# Patient Record
Sex: FEMALE | Race: WHITE | NOT HISPANIC OR LATINO | Employment: OTHER | URBAN - METROPOLITAN AREA
[De-identification: names, ages, dates, MRNs, and addresses within clinical notes are randomized per-mention and may not be internally consistent; named-entity substitution may affect disease eponyms.]

---

## 2019-04-09 ENCOUNTER — TELEPHONE (OUTPATIENT)
Dept: ORTHOPEDICS | Facility: CLINIC | Age: 69
End: 2019-04-09

## 2019-04-09 NOTE — TELEPHONE ENCOUNTER
----- Message from Monika Mcdonald sent at 4/9/2019 12:53 PM CDT -----  Type:  RX Refill Request    Who Called:  Self   Refill or New Rx:  New rx   RX Name and Strength: generic rx volterlin   tropical gel 1 %   How is the patient currently taking it? (ex. 1XDay):  Is this a 30 day or 90 day RX:  30 day supply   Preferred Pharmacy with phone number:  [facility] in [place]  Local or Mail Order:  Local   Ordering Provider:  Dr Fontaine   [facility] Call Back Number:  [phone]  Additional Information:

## 2019-11-06 ENCOUNTER — OFFICE VISIT (OUTPATIENT)
Dept: OBSTETRICS AND GYNECOLOGY | Facility: CLINIC | Age: 69
End: 2019-11-06
Payer: MEDICARE

## 2019-11-06 ENCOUNTER — LAB VISIT (OUTPATIENT)
Dept: LAB | Facility: HOSPITAL | Age: 69
End: 2019-11-06
Attending: NURSE PRACTITIONER
Payer: MEDICARE

## 2019-11-06 ENCOUNTER — TELEPHONE (OUTPATIENT)
Dept: OBSTETRICS AND GYNECOLOGY | Facility: CLINIC | Age: 69
End: 2019-11-06

## 2019-11-06 VITALS
SYSTOLIC BLOOD PRESSURE: 132 MMHG | DIASTOLIC BLOOD PRESSURE: 68 MMHG | BODY MASS INDEX: 33.79 KG/M2 | HEIGHT: 61 IN | WEIGHT: 179 LBS

## 2019-11-06 DIAGNOSIS — C53.9 MALIGNANT NEOPLASM OF CERVIX, UNSPECIFIED SITE: ICD-10-CM

## 2019-11-06 DIAGNOSIS — C53.9 MALIGNANT NEOPLASM OF CERVIX, UNSPECIFIED SITE: Primary | ICD-10-CM

## 2019-11-06 DIAGNOSIS — R10.84 GENERALIZED ABDOMINAL PAIN: ICD-10-CM

## 2019-11-06 DIAGNOSIS — R10.2 PELVIC PAIN IN FEMALE: ICD-10-CM

## 2019-11-06 LAB
ALBUMIN SERPL BCP-MCNC: 3.8 G/DL (ref 3.5–5.2)
ALP SERPL-CCNC: 70 U/L (ref 55–135)
ALT SERPL W/O P-5'-P-CCNC: 10 U/L (ref 10–44)
ANION GAP SERPL CALC-SCNC: 12 MMOL/L (ref 8–16)
AST SERPL-CCNC: 14 U/L (ref 10–40)
BASOPHILS # BLD AUTO: 0.05 K/UL (ref 0–0.2)
BASOPHILS NFR BLD: 0.8 % (ref 0–1.9)
BILIRUB SERPL-MCNC: 0.7 MG/DL (ref 0.1–1)
BUN SERPL-MCNC: 12 MG/DL (ref 8–23)
CALCIUM SERPL-MCNC: 9.7 MG/DL (ref 8.7–10.5)
CHLORIDE SERPL-SCNC: 102 MMOL/L (ref 95–110)
CO2 SERPL-SCNC: 26 MMOL/L (ref 23–29)
CREAT SERPL-MCNC: 0.8 MG/DL (ref 0.5–1.4)
DIFFERENTIAL METHOD: ABNORMAL
EOSINOPHIL # BLD AUTO: 0.2 K/UL (ref 0–0.5)
EOSINOPHIL NFR BLD: 3.2 % (ref 0–8)
ERYTHROCYTE [DISTWIDTH] IN BLOOD BY AUTOMATED COUNT: 13.1 % (ref 11.5–14.5)
EST. GFR  (AFRICAN AMERICAN): >60 ML/MIN/1.73 M^2
EST. GFR  (NON AFRICAN AMERICAN): >60 ML/MIN/1.73 M^2
GLUCOSE SERPL-MCNC: 89 MG/DL (ref 70–110)
HCT VFR BLD AUTO: 39.9 % (ref 37–48.5)
HGB BLD-MCNC: 12.5 G/DL (ref 12–16)
IMM GRANULOCYTES # BLD AUTO: 0.02 K/UL (ref 0–0.04)
IMM GRANULOCYTES NFR BLD AUTO: 0.3 % (ref 0–0.5)
LYMPHOCYTES # BLD AUTO: 1.5 K/UL (ref 1–4.8)
LYMPHOCYTES NFR BLD: 26 % (ref 18–48)
MCH RBC QN AUTO: 28.3 PG (ref 27–31)
MCHC RBC AUTO-ENTMCNC: 31.3 G/DL (ref 32–36)
MCV RBC AUTO: 91 FL (ref 82–98)
MONOCYTES # BLD AUTO: 0.4 K/UL (ref 0.3–1)
MONOCYTES NFR BLD: 7.4 % (ref 4–15)
NEUTROPHILS # BLD AUTO: 3.7 K/UL (ref 1.8–7.7)
NEUTROPHILS NFR BLD: 62.3 % (ref 38–73)
NRBC BLD-RTO: 0 /100 WBC
PLATELET # BLD AUTO: 297 K/UL (ref 150–350)
PMV BLD AUTO: 9.4 FL (ref 9.2–12.9)
POTASSIUM SERPL-SCNC: 4 MMOL/L (ref 3.5–5.1)
PROT SERPL-MCNC: 7.7 G/DL (ref 6–8.4)
RBC # BLD AUTO: 4.41 M/UL (ref 4–5.4)
SODIUM SERPL-SCNC: 140 MMOL/L (ref 136–145)
WBC # BLD AUTO: 5.93 K/UL (ref 3.9–12.7)

## 2019-11-06 PROCEDURE — 87624 HPV HI-RISK TYP POOLED RSLT: CPT

## 2019-11-06 PROCEDURE — 3078F PR MOST RECENT DIASTOLIC BLOOD PRESSURE < 80 MM HG: ICD-10-PCS | Mod: CPTII,S$GLB,, | Performed by: NURSE PRACTITIONER

## 2019-11-06 PROCEDURE — G0101 PR CA SCREEN;PELVIC/BREAST EXAM: ICD-10-PCS | Mod: S$GLB,,, | Performed by: NURSE PRACTITIONER

## 2019-11-06 PROCEDURE — 3075F SYST BP GE 130 - 139MM HG: CPT | Mod: CPTII,S$GLB,, | Performed by: NURSE PRACTITIONER

## 2019-11-06 PROCEDURE — 87801 DETECT AGNT MULT DNA AMPLI: CPT

## 2019-11-06 PROCEDURE — 3078F DIAST BP <80 MM HG: CPT | Mod: CPTII,S$GLB,, | Performed by: NURSE PRACTITIONER

## 2019-11-06 PROCEDURE — 80053 COMPREHEN METABOLIC PANEL: CPT

## 2019-11-06 PROCEDURE — 3075F PR MOST RECENT SYSTOLIC BLOOD PRESS GE 130-139MM HG: ICD-10-PCS | Mod: CPTII,S$GLB,, | Performed by: NURSE PRACTITIONER

## 2019-11-06 PROCEDURE — 36415 COLL VENOUS BLD VENIPUNCTURE: CPT

## 2019-11-06 PROCEDURE — 99999 PR PBB SHADOW E&M-EST. PATIENT-LVL III: CPT | Mod: PBBFAC,,, | Performed by: NURSE PRACTITIONER

## 2019-11-06 PROCEDURE — 87491 CHLMYD TRACH DNA AMP PROBE: CPT

## 2019-11-06 PROCEDURE — 87481 CANDIDA DNA AMP PROBE: CPT | Mod: 59

## 2019-11-06 PROCEDURE — 85025 COMPLETE CBC W/AUTO DIFF WBC: CPT

## 2019-11-06 PROCEDURE — G0101 CA SCREEN;PELVIC/BREAST EXAM: HCPCS | Mod: S$GLB,,, | Performed by: NURSE PRACTITIONER

## 2019-11-06 PROCEDURE — 88175 CYTOPATH C/V AUTO FLUID REDO: CPT

## 2019-11-06 PROCEDURE — 99999 PR PBB SHADOW E&M-EST. PATIENT-LVL III: ICD-10-PCS | Mod: PBBFAC,,, | Performed by: NURSE PRACTITIONER

## 2019-11-06 RX ORDER — BUSPIRONE HYDROCHLORIDE 30 MG/1
TABLET ORAL
COMMUNITY
Start: 2019-08-08 | End: 2021-03-15

## 2019-11-06 RX ORDER — CLONAZEPAM 0.5 MG/1
TABLET ORAL
COMMUNITY
Start: 2019-08-08 | End: 2021-03-15

## 2019-11-06 RX ORDER — PANTOPRAZOLE SODIUM 40 MG/1
TABLET, DELAYED RELEASE ORAL
COMMUNITY

## 2019-11-06 RX ORDER — POTASSIUM CHLORIDE 750 MG/1
CAPSULE, EXTENDED RELEASE ORAL
COMMUNITY
Start: 2017-10-12

## 2019-11-06 RX ORDER — ALENDRONATE SODIUM 70 MG/1
TABLET ORAL
COMMUNITY
Start: 2019-11-01 | End: 2021-03-15

## 2019-11-06 RX ORDER — PHENYLPROPANOLAMINE/CLEMASTINE 75-1.34MG
TABLET, EXTENDED RELEASE ORAL
COMMUNITY
Start: 2019-10-25 | End: 2021-03-15

## 2019-11-06 RX ORDER — IBUPROFEN 200 MG
CAPSULE ORAL
COMMUNITY
Start: 2019-10-25 | End: 2021-03-15

## 2019-11-06 RX ORDER — ONDANSETRON 4 MG/1
TABLET, ORALLY DISINTEGRATING ORAL
COMMUNITY
Start: 2019-09-06

## 2019-11-06 RX ORDER — VERAPAMIL HYDROCHLORIDE 120 MG/1
120 CAPSULE, EXTENDED RELEASE ORAL
COMMUNITY
Start: 2019-10-30

## 2019-11-06 RX ORDER — ACETAMINOPHEN 500 MG
TABLET ORAL NIGHTLY
COMMUNITY
Start: 2019-08-28

## 2019-11-06 RX ORDER — CHLORTHALIDONE 25 MG/1
25 TABLET ORAL DAILY
COMMUNITY
Start: 2019-08-19 | End: 2020-08-18

## 2019-11-06 RX ORDER — MIRABEGRON 50 MG/1
TABLET, FILM COATED, EXTENDED RELEASE ORAL
COMMUNITY
Start: 2019-10-11

## 2019-11-06 RX ORDER — SPIRONOLACTONE 25 MG/1
TABLET ORAL
COMMUNITY
Start: 2019-10-28

## 2019-11-06 RX ORDER — OLOPATADINE HYDROCHLORIDE 2 MG/ML
SOLUTION/ DROPS OPHTHALMIC
COMMUNITY
Start: 2019-10-11

## 2019-11-06 RX ORDER — ASPIRIN 81 MG/1
81 TABLET ORAL
COMMUNITY
Start: 2019-06-24

## 2019-11-06 NOTE — TELEPHONE ENCOUNTER
Hi this pt has a history of breast cancer and MsChicho Gisella Goodwin is requesting that she follows up with Sabiha Ortiz. I attempted to schedule her with Angel but was having trouble. Can you please assist?

## 2019-11-06 NOTE — PROGRESS NOTES
"CC: Well woman exam    Marion Arauz is a 68 y.o. female  presents for well woman exam.. No AUB. Patient gives a history of " cervical, endometrial and left breast cancer", dx in . Patient was followed by GYN MD Tr Haywood. Patient reports that she had radiation, but no surgical intervention. Has not been followed since . Patient reports " was seen by GYN MD at [facility] and had pap exam, which was normal". Patient reports a normal mammogram 10/2019, BRG. Bone scan, 2019, indicated Osteoporosis, is currently on Fosamax.  Is sexually active " several times/year". Patient states that she had an acute onset of pelvic pain 2 weeks ago. No fever, dysuria or bowel changes. Nothing makes it better or worse.     Past Medical History:   Diagnosis Date    Breast cancer     Cervical cancer     COPD (chronic obstructive pulmonary disease)     Hypertension     Uterine cancer      Past Surgical History:   Procedure Laterality Date    bladder lift      2016    BREAST BIOPSY Left     TOTAL HIP ARTHROPLASTY Bilateral     TUBAL LIGATION       Social History     Socioeconomic History    Marital status:      Spouse name: Not on file    Number of children: Not on file    Years of education: Not on file    Highest education level: Not on file   Occupational History    Not on file   Social Needs    Financial resource strain: Not on file    Food insecurity:     Worry: Not on file     Inability: Not on file    Transportation needs:     Medical: Not on file     Non-medical: Not on file   Tobacco Use    Smoking status: Never Smoker    Smokeless tobacco: Never Used   Substance and Sexual Activity    Alcohol use: No    Drug use: No    Sexual activity: Not Currently   Lifestyle    Physical activity:     Days per week: Not on file     Minutes per session: Not on file    Stress: Not on file   Relationships    Social connections:     Talks on phone: Not on file     Gets together: Not on file " "    Attends Christian service: Not on file     Active member of club or organization: Not on file     Attends meetings of clubs or organizations: Not on file     Relationship status: Not on file   Other Topics Concern    Not on file   Social History Narrative    Not on file     Family History   Problem Relation Age of Onset    Liver cancer Father     No Known Problems Mother     No Known Problems Brother     Colon cancer Sister     Pacemaker/defibrilator Sister      OB History        2    Para   2    Term   2            AB        Living   2       SAB        TAB        Ectopic        Multiple        Live Births   2                 /68   Ht 5' 1" (1.549 m)   Wt 81.2 kg (179 lb 0.2 oz)   BMI 33.82 kg/m²       ROS:  GENERAL: Denies weight gain or weight loss. Feeling well overall.   SKIN: Denies rash or lesions.   HEAD: Denies head injury or headache.   NODES: Denies enlarged lymph nodes.   CHEST: Denies chest pain or shortness of breath.   CARDIOVASCULAR: Denies palpitations or left sided chest pain.   ABDOMEN: HPI  URINARY: No frequency, dysuria, hematuria, or burning on urination.  REPRODUCTIVE: See HPI.   BREASTS: HPI  HEMATOLOGIC: No easy bruisability or excessive bleeding.   MUSCULOSKELETAL: Denies joint pain or swelling.   NEUROLOGIC: Denies syncope or weakness.   PSYCHIATRIC: Denies depression, anxiety or mood swings.    PHYSICAL EXAM:  APPEARANCE: Well nourished, well developed, in no acute distress.  AFFECT: WNL, alert and oriented x 3  SKIN: No acne or hirsutism  NECK: Neck symmetric without masses or thyromegaly  NODES: No inguinal, cervical, axillary, or femoral lymph node enlargement  CHEST: Good respiratory effect  ABDOMEN: Soft.  No tenderness or masses.  No hepatosplenomegaly.  No hernias.  BREASTS: Symmetrical, no skin changes or visible lesions.  No palpable masses, nipple discharge bilaterally.  PELVIC: Normal external genitalia without lesions.  Normal hair " distribution.  Adequate perineal body, normal urethral meatus.  Vagina atrophy lesions or discharge.  Cervix pink, without lesions, discharge or tenderness.  No significant cystocele or rectocele.  Bimanual exam shows uterus to be normal size, regular, mobile and nontender.  Adnexa without masses or tenderness.    EXTREMITIES: No edema.    1. Malignant neoplasm of cervix, unspecified site  CBC auto differential    Comprehensive metabolic panel    Liquid-based pap smear, screening    HPV High Risk Genotypes, PCR   2. Generalized abdominal pain  CT Abdomen Pelvis W Wo Contrast   3. Pelvic pain in female  C. trachomatis/N. gonorrhoeae by AMP DNA    Vaginosis Screen by DNA Probe    Urine culture   PLAN:  Patient was presented for recommendation ( Dr. Hood)  1. Pap exam  2. CY scan abd/pelvic w/wo  3. CBC and CMP  4. Appt with JORGE A Ortiz NP  5. Appt for follow-up with GYN-oncology   6. Urine, Affirm and GC cx  Exam was unremarkable

## 2019-11-07 LAB
BACTERIAL VAGINOSIS DNA: NEGATIVE
C TRACH DNA SPEC QL NAA+PROBE: NOT DETECTED
CANDIDA GLABRATA DNA: POSITIVE
CANDIDA KRUSEI DNA: NEGATIVE
CANDIDA RRNA VAG QL PROBE: NEGATIVE
N GONORRHOEA DNA SPEC QL NAA+PROBE: NOT DETECTED
T VAGINALIS RRNA GENITAL QL PROBE: NEGATIVE

## 2019-11-07 RX ORDER — FLUCONAZOLE 150 MG/1
150 TABLET ORAL DAILY
Qty: 2 TABLET | Refills: 0 | Status: SHIPPED | OUTPATIENT
Start: 2019-11-07 | End: 2019-11-09

## 2019-11-08 ENCOUNTER — TELEPHONE (OUTPATIENT)
Dept: OBSTETRICS AND GYNECOLOGY | Facility: CLINIC | Age: 69
End: 2019-11-08

## 2019-11-08 DIAGNOSIS — R10.2 PELVIC PAIN IN FEMALE: Primary | ICD-10-CM

## 2019-11-08 NOTE — TELEPHONE ENCOUNTER
Spoke with patient notified to collect a urine on 11/12/19 while at [facility] . Patient verbalized understanding       Lab appt made

## 2019-11-08 NOTE — TELEPHONE ENCOUNTER
----- Message from Lisa Fernandez sent at 11/8/2019  3:24 PM CST -----  Contact: pt  .Type:  Test Results    Who Called: pt  Name of Test (Lab/Mammo/Etc): pap  Date of Test: 11/6/19  Ordering Provider: Rushing  Where the test was performed: [facility]   Would the patient rather a call back or a response via [facility]? Call back  Best Call Back Number: [phone]  Additional Information:

## 2019-11-11 ENCOUNTER — TELEPHONE (OUTPATIENT)
Dept: RADIOLOGY | Facility: HOSPITAL | Age: 69
End: 2019-11-11

## 2019-11-12 ENCOUNTER — HOSPITAL ENCOUNTER (OUTPATIENT)
Dept: RADIOLOGY | Facility: HOSPITAL | Age: 69
Discharge: HOME | End: 2019-11-12
Attending: NURSE PRACTITIONER
Payer: MEDICARE

## 2019-11-12 ENCOUNTER — OFFICE VISIT (OUTPATIENT)
Dept: HEMATOLOGY/ONCOLOGY | Facility: CLINIC | Age: 69
End: 2019-11-12
Payer: MEDICARE

## 2019-11-12 VITALS
DIASTOLIC BLOOD PRESSURE: 81 MMHG | OXYGEN SATURATION: 99 % | HEIGHT: 61 IN | WEIGHT: 179 LBS | TEMPERATURE: 98 F | BODY MASS INDEX: 33.79 KG/M2 | HEART RATE: 94 BPM | SYSTOLIC BLOOD PRESSURE: 164 MMHG

## 2019-11-12 DIAGNOSIS — R10.2 PELVIC PAIN: Primary | ICD-10-CM

## 2019-11-12 DIAGNOSIS — Z85.41 HISTORY OF CERVICAL CANCER IN ADULTHOOD: ICD-10-CM

## 2019-11-12 DIAGNOSIS — Z85.3 HISTORY OF BREAST CANCER IN FEMALE: ICD-10-CM

## 2019-11-12 DIAGNOSIS — R10.84 GENERALIZED ABDOMINAL PAIN: ICD-10-CM

## 2019-11-12 PROCEDURE — 99999 PR PBB SHADOW E&M-EST. PATIENT-LVL V: ICD-10-PCS | Mod: PBBFAC,,, | Performed by: INTERNAL MEDICINE

## 2019-11-12 PROCEDURE — 25500020 PHARM REV CODE 255: Performed by: NURSE PRACTITIONER

## 2019-11-12 PROCEDURE — 3079F DIAST BP 80-89 MM HG: CPT | Mod: CPTII,S$GLB,, | Performed by: INTERNAL MEDICINE

## 2019-11-12 PROCEDURE — 3077F PR MOST RECENT SYSTOLIC BLOOD PRESSURE >= 140 MM HG: ICD-10-PCS | Mod: CPTII,S$GLB,, | Performed by: INTERNAL MEDICINE

## 2019-11-12 PROCEDURE — 74178 CT ABD&PLV WO CNTR FLWD CNTR: CPT | Mod: 26,,, | Performed by: RADIOLOGY

## 2019-11-12 PROCEDURE — 74178 CT ABDOMEN PELVIS W WO CONTRAST: ICD-10-PCS | Mod: 26,,, | Performed by: RADIOLOGY

## 2019-11-12 PROCEDURE — 99999 PR PBB SHADOW E&M-EST. PATIENT-LVL V: CPT | Mod: PBBFAC,,, | Performed by: INTERNAL MEDICINE

## 2019-11-12 PROCEDURE — 3077F SYST BP >= 140 MM HG: CPT | Mod: CPTII,S$GLB,, | Performed by: INTERNAL MEDICINE

## 2019-11-12 PROCEDURE — 74178 CT ABD&PLV WO CNTR FLWD CNTR: CPT | Mod: TC

## 2019-11-12 PROCEDURE — 1101F PR PT FALLS ASSESS DOC 0-1 FALLS W/OUT INJ PAST YR: ICD-10-PCS | Mod: CPTII,S$GLB,, | Performed by: INTERNAL MEDICINE

## 2019-11-12 PROCEDURE — 3079F PR MOST RECENT DIASTOLIC BLOOD PRESSURE 80-89 MM HG: ICD-10-PCS | Mod: CPTII,S$GLB,, | Performed by: INTERNAL MEDICINE

## 2019-11-12 PROCEDURE — 99205 OFFICE O/P NEW HI 60 MIN: CPT | Mod: S$GLB,,, | Performed by: INTERNAL MEDICINE

## 2019-11-12 PROCEDURE — 99205 PR OFFICE/OUTPT VISIT, NEW, LEVL V, 60-74 MIN: ICD-10-PCS | Mod: S$GLB,,, | Performed by: INTERNAL MEDICINE

## 2019-11-12 PROCEDURE — 1101F PT FALLS ASSESS-DOCD LE1/YR: CPT | Mod: CPTII,S$GLB,, | Performed by: INTERNAL MEDICINE

## 2019-11-12 RX ADMIN — IOHEXOL 30 ML: 350 INJECTION, SOLUTION INTRAVENOUS at 11:11

## 2019-11-12 RX ADMIN — IOHEXOL 100 ML: 350 INJECTION, SOLUTION INTRAVENOUS at 12:11

## 2019-11-12 NOTE — LETTER
November 14, 2019      Gisella Goodwin NP  [address] [place] [address]            [facility]  [address]  [place] [address]  Phone: [phone]  Fax: [phone]          Patient: Marion Arauz   MR Number: [id number]   YOB: 1950   Date of Visit: 11/12/2019       Dear Gisella Goodwin:    Thank you for referring Marion Arauz to me for evaluation. Attached you will find relevant portions of my assessment and plan of care.    If you have questions, please do not hesitate to call me. I look forward to following Marion Arauz along with you.    Sincerely,    Jayla Farias MD    Enclosure  CC:  No Recipients    If you would like to receive this communication electronically, please contact [email or web address] or [phone] to request more information on [email or web address] Link access.    For providers and/or their staff who would like to refer a patient to [facility], please contact us through our one-stop-shop provider referral line, [employer] , at [phone].    If you feel you have received this communication in error or would no longer like to receive these types of communications, please e-mail [email or web address]

## 2019-11-13 LAB
HPV HR 12 DNA CVX QL NAA+PROBE: NEGATIVE
HPV16 AG SPEC QL: NEGATIVE
HPV18 DNA SPEC QL NAA+PROBE: NEGATIVE

## 2019-11-13 NOTE — PROGRESS NOTES
Please call patient and inform her that CT scan indicated No acute abnormality.  No evidence of malignancy in the abdomen or pelvis.Needs to follow-up with GYN-oncology MD.

## 2019-11-14 PROBLEM — Z85.3 HISTORY OF BREAST CANCER IN FEMALE: Status: ACTIVE | Noted: 2019-11-14

## 2019-11-14 PROBLEM — Z85.41 HISTORY OF CERVICAL CANCER IN ADULTHOOD: Status: ACTIVE | Noted: 2019-11-14

## 2019-11-15 NOTE — PROGRESS NOTES
Subjective:      DATE OF VISIT: 11/12/19     ?  Patient ID:?Marion Arauz is a 69 y.o. female.?? MR#: [id number]   ?   REFERRING PROVIDER: Gisella Goodwin NP  [address] [place] [address]     ? Primary Care Providers:  Letty Rossi MD, MD (General)     CHIEF COMPLAINT: ?  Establish care with Medical Oncology???   ?   ONCOLOGIC DIAGNOSIS:  Cervical cancer, localized breast cancer  ?   CURRENT TREATMENT:  Surveillance    PAST TREATMENT:  Chemoradiation for cervical cancer  Breast conserving therapy and adjuvant radiation and Arimidex for 5 years for localized breast cancer  ?     HPI    I had the pleasure meeting Ms. Arauz who is accompanied by her sister.  Please note at the time of this initial consultation I do not have available medical records or pathology reports and in seeking to obtain to confirm diagnoses and past treatment.    She notes going through menopause in her 30s.  In 2007 she developed groin pain and vaginal spotting with dyspareunia.  She was seen at [facility] and ultimately diagnosed with cervical cancer treated with chemoradiation and brachytherapy by Dr. Kelley at [facility].     During workup for cervical cancer she had mammogram with finding of localized breast cancer for which she underwent breast conserving therapy, adjuvant radiation and completed 5 years of aromatase inhibitor with anastrozole.    She does endorse central pelvic discomfort at times.  She was ordered CT abdomen pelvis which was without notable abnormality.  During her visit with me today she denies abdominal/pelvic distension, bowel abnormality, melena, hematochezia, dyspareunia, vaginal discharge, fevers/chills last night sweats or unintentional weight loss.  She notes that since completion of therapy above she has not had follow-up with radiation, medical or surgical oncology or gynecologic oncology.  She does stay up-to-date on yearly mammogram, most recent and [facility] in  October 2019 within normal limits per patient report.  She does have a strong family history of malignancy including sister with colon cancer at 42 years old, maternal aunt with breast cancer and a maternal niece with breast cancer all at early ages.  She discusses history of possible endometriosis but at times notes she thought this was endometrial cancer.  She has not had hysterectomy.  We are working to obtain outside records to clarify her diagnoses.    Review of Systems    ?   A comprehensive 14-point review of systems was reviewed with patient and was negative other than as specified above.   ?   PAST MEDICAL HISTORY:   Past Medical History:   Diagnosis Date    Breast cancer     Cervical cancer     COPD (chronic obstructive pulmonary disease)     Hypertension     Uterine cancer     ?     PAST SURGICAL HISTORY:   Past Surgical History:   Procedure Laterality Date    bladder lift      2016    BREAST BIOPSY Left     TOTAL HIP ARTHROPLASTY Bilateral     TUBAL LIGATION        ?   ALLERGIES:   Allergies as of 11/12/2019    (No Known Allergies)      ?   MEDICATIONS:?   Outpatient Medications Marked as Taking for the 11/12/19 encounter (Office Visit) with Jayla Farias MD   Medication Sig Dispense Refill    adhesive bandage Bndg       albuterol (PROAIR HFA) 90 mcg/actuation inhaler Inhale 2 puffs into the lungs every 4 (four) hours as needed for Wheezing. 18 g 6    alcohol antiseptic pads (ALCOHOL PREP PADS TOP)       alendronate (FOSAMAX) 70 MG tablet 1 tablet 30 minutes before the first food, beverage or medicine of the day with plain water      amlodipine (NORVASC) 5 MG tablet Take 5 mg by mouth once daily.       aspirin (ECOTRIN) 81 MG EC tablet Take 81 mg by mouth.      busPIRone (BUSPAR) 30 MG Tab       Ca-D3-mag-zinc--edmar-boron (CALTRATE 600-D PLUS MINERALS) 600 mg calcium- 800 unit-40 mg Chew 1 tablet with a meal      chlorthalidone (HYGROTEN) 25 MG Tab Take 25 mg by mouth once  daily.      clonazePAM (KLONOPIN) 0.5 MG tablet       container,empty (7 DAY PILL BOX MISC)       duloxetine (CYMBALTA) 60 MG capsule Take 120 mg by mouth once daily.       guaifenesin-codeine 100-10 mg/5 ml (TUSSI-ORGANIDIN NR)  mg/5 mL syrup Take 5 mLs by mouth every 6 (six) hours as needed for Cough. 180 mL 0    ibuprofen 200 mg Cap       IRON, FERROUS SULFATE, ORAL Take 5 mg by mouth.      losartan (COZAAR) 50 MG tablet Take 50 mg by mouth once daily.      melatonin 5 mg Tab every evening.      mupirocin (BACTROBAN) 2 % ointment       MYRBETRIQ 50 mg Tb24       olopatadine (PATADAY) 0.2 % Drop       ondansetron (ZOFRAN-ODT) 4 MG TbDL       pantoprazole (PROTONIX) 40 MG tablet Take by mouth.      potassium chloride (MICRO-K) 10 MEQ CpSR       spironolactone (ALDACTONE) 25 MG tablet       tramadol (ULTRAM) 50 mg tablet Take 50 mg by mouth as needed.       verapamil (VERELAN) 120 MG C24P Take 120 mg by mouth.        ?   SOCIAL HISTORY:?   Social History     Tobacco Use    Smoking status: Never Smoker    Smokeless tobacco: Never Used   Substance Use Topics    Alcohol use: No      ?      ?   FAMILY HISTORY:   family history includes Colon cancer in her sister; Liver cancer in her father; No Known Problems in her brother and mother; Pacemaker/defibrilator in her sister.   ?   Sister with colon cancer at 42, maternal aunt with breast cancer and maternal niece with breast cancer     Objective:      Physical Exam      ?   Vitals:    11/12/19 1501   BP: (!) 164/81   Pulse: 94   Temp: 97.7 °F (36.5 °C)      ?   ECOG:? 0  General appearance: Generally well appearing, in no acute distress.   Head, eyes, ears, nose, and throat: Pupils round and equally reactive to light. Oropharynx clear with moist mucous membranes.   Lymph: No palpable cervical or supraclavicular lymphadenopathy.   Cardiovascular: Regular rate and rhythm, S1, S2, no audible murmurs.   Respiratory: Lungs clear to auscultation  bilaterally.   Abdomen: Bowel sounds present, soft, nontender, nondistended. No palpable hepatosplenomegaly.   Extremities: Warm, without edema.   Neurologic: Alert and oriented. Grossly normal strength, coordination, and gait.   Skin: No rashes, ecchymoses or petechial lesion.   ?      ?   Laboratory:  ?   Lab Results   Component Value Date    WBC 5.93 11/06/2019    HGB 12.5 11/06/2019    HCT 39.9 11/06/2019    MCV 91 11/06/2019     11/06/2019         Chemistry        Component Value Date/Time     11/06/2019 1241    K 4.0 11/06/2019 1241     11/06/2019 1241    CO2 26 11/06/2019 1241    BUN 12 11/06/2019 1241    CREATININE 0.8 11/06/2019 1241    GLU 89 11/06/2019 1241        Component Value Date/Time    CALCIUM 9.7 11/06/2019 1241    ALKPHOS 70 11/06/2019 1241    AST 14 11/06/2019 1241    ALT 10 11/06/2019 1241    BILITOT 0.7 11/06/2019 1241    ESTGFRAFRICA >60 11/06/2019 1241    EGFRNONAA >60 11/06/2019 1241          ?   ?   Imaging:  ?  I have reviewed the patient's medical history in detail and updated the computerized patient record.        Pathology:    Seeking to obtain outside pathology records for cervical cancer and breast cancer.    ?   Assessment/Plan:       1. Pelvic pain    2. History of breast cancer in female    3. History of cervical cancer in adulthood          Plan:     # pelvic pain:  Mild, occasional central pelvic pain without other associated symptoms.  CT abdomen pelvis in November 2018 without abnormal findings although some limitation by streak artifact.  She has not had gynecologic oncology follow-up since completion of her treatment and recommended pelvic exam and establishing care with Gynecologic Oncology at [facility] and I have placed referral for this.  She describes likely history of endometriosis although at times is confused whether this is endometrial cancer however feel this is less likely given lack of hysterectomy.  We are working to obtain outside records for  "diagnostic/pathologic confirmation of her diagnosis.    # history of cervical cancer:  Patient describes history of chemoradiation and brachytherapy at [facility] with Dr. Kelley.  Working to obtain records of pathology and treatment    # history of localized breast cancer:  Patient describes treatment with breast conserving therapy and adjuvant radiation and 5 years anastrozole.  She notes recent yearly mammogram at that [facility] October 2019.  Reviewed importance of surveillance and healthy lifestyle. She does use occasional topical vaginal estrogen and I discussed that is unclear if minimal systemic absorption may increase risk of recurrent malignancy and would avoid frequent use and certainly systemic estrogen.    # family history of malignancy:  History of sister with colon cancer, maternal aunt and maternal niece with breast cancer at early ages less than 50.  She notes that during her personal history of breast cancer she does not recall having any genetic testing.  Referral to genetic counseling.    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   06/13/2008 CT pelvis showed cervical mass 7.5 cm x 4 cm x 3 cm.  No enlarged lymph nodes. Enhancement of myometrium and lower uterine segment is suspicious for myometrial invasion.  Is  Status post left lumpectomy and axillary    6/13/08 abnormal mammogram showed a 12:00 position of left breast 6 cm cephalad to nipple suspicious abnormality  6/16/2008 ultrasound-guided biopsy of high hypoechoic mass in left breast at 12:00 p.m Pathology initially noted  "focal small grands, infiltrating carcinoma cannot be ruled out." In supplement noted "infiltrating ductal carcinoma grade 1. Comment: the special stain shows infiltrating single cells and   7/30/08 surgical pathology did not note " evidence of malinancy in breast of 2 lymph nodes.

## 2019-11-18 ENCOUNTER — TELEPHONE (OUTPATIENT)
Dept: HEMATOLOGY/ONCOLOGY | Facility: CLINIC | Age: 69
End: 2019-11-18

## 2019-11-18 NOTE — TELEPHONE ENCOUNTER
----- Message from Digna Boyle sent at 11/18/2019  3:15 PM CST -----  Contact: patient  Calling concerning rescheduling her appt for genetic testing. Please call patient @ [phone]. Thanks, raúl

## 2021-03-12 ENCOUNTER — TELEPHONE (OUTPATIENT)
Dept: OBSTETRICS AND GYNECOLOGY | Facility: CLINIC | Age: 71
End: 2021-03-12

## 2021-03-15 ENCOUNTER — OFFICE VISIT (OUTPATIENT)
Dept: OBSTETRICS AND GYNECOLOGY | Facility: CLINIC | Age: 71
End: 2021-03-15
Payer: MEDICARE

## 2021-03-15 VITALS
BODY MASS INDEX: 33.87 KG/M2 | DIASTOLIC BLOOD PRESSURE: 76 MMHG | WEIGHT: 179.25 LBS | SYSTOLIC BLOOD PRESSURE: 136 MMHG

## 2021-03-15 DIAGNOSIS — R10.2 PELVIC PAIN IN FEMALE: Primary | ICD-10-CM

## 2021-03-15 PROCEDURE — 3288F PR FALLS RISK ASSESSMENT DOCUMENTED: ICD-10-PCS | Mod: CPTII,S$GLB,, | Performed by: NURSE PRACTITIONER

## 2021-03-15 PROCEDURE — 3288F FALL RISK ASSESSMENT DOCD: CPT | Mod: CPTII,S$GLB,, | Performed by: NURSE PRACTITIONER

## 2021-03-15 PROCEDURE — 3075F PR MOST RECENT SYSTOLIC BLOOD PRESS GE 130-139MM HG: ICD-10-PCS | Mod: CPTII,S$GLB,, | Performed by: NURSE PRACTITIONER

## 2021-03-15 PROCEDURE — 99999 PR PBB SHADOW E&M-EST. PATIENT-LVL III: CPT | Mod: PBBFAC,,, | Performed by: NURSE PRACTITIONER

## 2021-03-15 PROCEDURE — 1126F AMNT PAIN NOTED NONE PRSNT: CPT | Mod: S$GLB,,, | Performed by: NURSE PRACTITIONER

## 2021-03-15 PROCEDURE — 1101F PT FALLS ASSESS-DOCD LE1/YR: CPT | Mod: CPTII,S$GLB,, | Performed by: NURSE PRACTITIONER

## 2021-03-15 PROCEDURE — 99999 PR PBB SHADOW E&M-EST. PATIENT-LVL III: ICD-10-PCS | Mod: PBBFAC,,, | Performed by: NURSE PRACTITIONER

## 2021-03-15 PROCEDURE — 3078F DIAST BP <80 MM HG: CPT | Mod: CPTII,S$GLB,, | Performed by: NURSE PRACTITIONER

## 2021-03-15 PROCEDURE — 99214 PR OFFICE/OUTPT VISIT, EST, LEVL IV, 30-39 MIN: ICD-10-PCS | Mod: S$GLB,,, | Performed by: NURSE PRACTITIONER

## 2021-03-15 PROCEDURE — 3008F BODY MASS INDEX DOCD: CPT | Mod: CPTII,S$GLB,, | Performed by: NURSE PRACTITIONER

## 2021-03-15 PROCEDURE — 1101F PR PT FALLS ASSESS DOC 0-1 FALLS W/OUT INJ PAST YR: ICD-10-PCS | Mod: CPTII,S$GLB,, | Performed by: NURSE PRACTITIONER

## 2021-03-15 PROCEDURE — 3078F PR MOST RECENT DIASTOLIC BLOOD PRESSURE < 80 MM HG: ICD-10-PCS | Mod: CPTII,S$GLB,, | Performed by: NURSE PRACTITIONER

## 2021-03-15 PROCEDURE — 1159F PR MEDICATION LIST DOCUMENTED IN MEDICAL RECORD: ICD-10-PCS | Mod: S$GLB,,, | Performed by: NURSE PRACTITIONER

## 2021-03-15 PROCEDURE — 3008F PR BODY MASS INDEX (BMI) DOCUMENTED: ICD-10-PCS | Mod: CPTII,S$GLB,, | Performed by: NURSE PRACTITIONER

## 2021-03-15 PROCEDURE — 99214 OFFICE O/P EST MOD 30 MIN: CPT | Mod: S$GLB,,, | Performed by: NURSE PRACTITIONER

## 2021-03-15 PROCEDURE — 1159F MED LIST DOCD IN RCRD: CPT | Mod: S$GLB,,, | Performed by: NURSE PRACTITIONER

## 2021-03-15 PROCEDURE — 3075F SYST BP GE 130 - 139MM HG: CPT | Mod: CPTII,S$GLB,, | Performed by: NURSE PRACTITIONER

## 2021-03-15 PROCEDURE — 1126F PR PAIN SEVERITY QUANTIFIED, NO PAIN PRESENT: ICD-10-PCS | Mod: S$GLB,,, | Performed by: NURSE PRACTITIONER

## 2021-03-17 ENCOUNTER — HOSPITAL ENCOUNTER (OUTPATIENT)
Dept: RADIOLOGY | Facility: HOSPITAL | Age: 71
Discharge: HOME | End: 2021-03-17
Attending: NURSE PRACTITIONER
Payer: MEDICARE

## 2021-03-17 DIAGNOSIS — R10.2 PELVIC PAIN IN FEMALE: ICD-10-CM

## 2021-04-09 ENCOUNTER — HOSPITAL ENCOUNTER (OUTPATIENT)
Dept: RADIOLOGY | Facility: HOSPITAL | Age: 71
Discharge: HOME | End: 2021-04-09
Attending: NURSE PRACTITIONER
Payer: MEDICARE

## 2021-04-09 DIAGNOSIS — R10.2 PELVIC PAIN IN FEMALE: ICD-10-CM

## 2021-04-09 PROCEDURE — 76830 TRANSVAGINAL US NON-OB: CPT | Mod: TC

## 2021-05-06 ENCOUNTER — PATIENT MESSAGE (OUTPATIENT)
Dept: RESEARCH | Facility: HOSPITAL | Age: 71
End: 2021-05-06